# Patient Record
Sex: MALE | Race: WHITE | NOT HISPANIC OR LATINO | Employment: FULL TIME | URBAN - METROPOLITAN AREA
[De-identification: names, ages, dates, MRNs, and addresses within clinical notes are randomized per-mention and may not be internally consistent; named-entity substitution may affect disease eponyms.]

---

## 2023-02-23 ENCOUNTER — APPOINTMENT (OUTPATIENT)
Dept: GENERAL RADIOLOGY | Facility: HOSPITAL | Age: 33
End: 2023-02-23
Payer: COMMERCIAL

## 2023-02-23 ENCOUNTER — HOSPITAL ENCOUNTER (EMERGENCY)
Facility: HOSPITAL | Age: 33
Discharge: HOME | End: 2023-02-23
Attending: EMERGENCY MEDICINE | Admitting: EMERGENCY MEDICINE
Payer: COMMERCIAL

## 2023-02-23 VITALS
TEMPERATURE: 98.1 F | SYSTOLIC BLOOD PRESSURE: 130 MMHG | RESPIRATION RATE: 18 BRPM | HEART RATE: 96 BPM | DIASTOLIC BLOOD PRESSURE: 83 MMHG | BODY MASS INDEX: 25.18 KG/M2 | OXYGEN SATURATION: 95 % | WEIGHT: 170 LBS | HEIGHT: 69 IN

## 2023-02-23 DIAGNOSIS — J45.909 ASTHMA DUE TO ENVIRONMENTAL ALLERGIES: ICD-10-CM

## 2023-02-23 DIAGNOSIS — J45.901 MODERATE ASTHMA WITH EXACERBATION, UNSPECIFIED WHETHER PERSISTENT: Primary | ICD-10-CM

## 2023-02-23 LAB
ALBUMIN SERPL-MCNC: 4.8 G/DL (ref 3.5–5.2)
ALBUMIN/GLOB SERPL: 1.8 G/DL
ALP SERPL-CCNC: 64 U/L (ref 39–117)
ALT SERPL W P-5'-P-CCNC: 20 U/L (ref 1–41)
ANION GAP SERPL CALCULATED.3IONS-SCNC: 6.5 MMOL/L (ref 5–15)
AST SERPL-CCNC: 24 U/L (ref 1–40)
BASOPHILS # BLD AUTO: 0.06 10*3/MM3 (ref 0–0.2)
BASOPHILS NFR BLD AUTO: 0.5 % (ref 0–1.5)
BILIRUB SERPL-MCNC: 0.7 MG/DL (ref 0–1.2)
BUN SERPL-MCNC: 13 MG/DL (ref 6–20)
BUN/CREAT SERPL: 11.3 (ref 7–25)
CALCIUM SPEC-SCNC: 9.9 MG/DL (ref 8.6–10.5)
CHLORIDE SERPL-SCNC: 103 MMOL/L (ref 98–107)
CO2 SERPL-SCNC: 28.5 MMOL/L (ref 22–29)
CREAT SERPL-MCNC: 1.15 MG/DL (ref 0.76–1.27)
D DIMER PPP FEU-MCNC: <0.27 MCGFEU/ML (ref 0–0.5)
DEPRECATED RDW RBC AUTO: 39 FL (ref 37–54)
EGFRCR SERPLBLD CKD-EPI 2021: 87.3 ML/MIN/1.73
EOSINOPHIL # BLD AUTO: 0.63 10*3/MM3 (ref 0–0.4)
EOSINOPHIL NFR BLD AUTO: 5 % (ref 0.3–6.2)
ERYTHROCYTE [DISTWIDTH] IN BLOOD BY AUTOMATED COUNT: 11.9 % (ref 12.3–15.4)
GEN 5 2HR TROPONIN T REFLEX: <6 NG/L
GLOBULIN UR ELPH-MCNC: 2.7 GM/DL
GLUCOSE SERPL-MCNC: 139 MG/DL (ref 65–99)
HCT VFR BLD AUTO: 40.3 % (ref 37.5–51)
HGB BLD-MCNC: 14.1 G/DL (ref 13–17.7)
IMM GRANULOCYTES # BLD AUTO: 0.04 10*3/MM3 (ref 0–0.05)
IMM GRANULOCYTES NFR BLD AUTO: 0.3 % (ref 0–0.5)
INR PPP: 0.94 (ref 0.9–1.1)
LYMPHOCYTES # BLD AUTO: 1.68 10*3/MM3 (ref 0.7–3.1)
LYMPHOCYTES NFR BLD AUTO: 13.3 % (ref 19.6–45.3)
MAGNESIUM SERPL-MCNC: 2 MG/DL (ref 1.6–2.6)
MCH RBC QN AUTO: 31 PG (ref 26.6–33)
MCHC RBC AUTO-ENTMCNC: 35 G/DL (ref 31.5–35.7)
MCV RBC AUTO: 88.6 FL (ref 79–97)
MONOCYTES # BLD AUTO: 0.62 10*3/MM3 (ref 0.1–0.9)
MONOCYTES NFR BLD AUTO: 4.9 % (ref 5–12)
NEUTROPHILS NFR BLD AUTO: 76 % (ref 42.7–76)
NEUTROPHILS NFR BLD AUTO: 9.63 10*3/MM3 (ref 1.7–7)
NRBC BLD AUTO-RTO: 0 /100 WBC (ref 0–0.2)
NT-PROBNP SERPL-MCNC: 50.2 PG/ML (ref 0–450)
PLATELET # BLD AUTO: 262 10*3/MM3 (ref 140–450)
PMV BLD AUTO: 9.8 FL (ref 6–12)
POTASSIUM SERPL-SCNC: 3.6 MMOL/L (ref 3.5–5.2)
PROT SERPL-MCNC: 7.5 G/DL (ref 6–8.5)
PROTHROMBIN TIME: 12.7 SECONDS (ref 11.7–14.2)
QT INTERVAL: 345 MS
RBC # BLD AUTO: 4.55 10*6/MM3 (ref 4.14–5.8)
SODIUM SERPL-SCNC: 138 MMOL/L (ref 136–145)
TROPONIN T DELTA: NORMAL
TROPONIN T SERPL HS-MCNC: 6 NG/L
WBC NRBC COR # BLD: 12.66 10*3/MM3 (ref 3.4–10.8)

## 2023-02-23 PROCEDURE — 93005 ELECTROCARDIOGRAM TRACING: CPT | Performed by: EMERGENCY MEDICINE

## 2023-02-23 PROCEDURE — 25010000002 METHYLPREDNISOLONE PER 125 MG: Performed by: EMERGENCY MEDICINE

## 2023-02-23 PROCEDURE — 36415 COLL VENOUS BLD VENIPUNCTURE: CPT

## 2023-02-23 PROCEDURE — 71045 X-RAY EXAM CHEST 1 VIEW: CPT

## 2023-02-23 PROCEDURE — 93010 ELECTROCARDIOGRAM REPORT: CPT | Performed by: INTERNAL MEDICINE

## 2023-02-23 PROCEDURE — 85025 COMPLETE CBC W/AUTO DIFF WBC: CPT | Performed by: EMERGENCY MEDICINE

## 2023-02-23 PROCEDURE — 85610 PROTHROMBIN TIME: CPT | Performed by: EMERGENCY MEDICINE

## 2023-02-23 PROCEDURE — 84484 ASSAY OF TROPONIN QUANT: CPT | Performed by: EMERGENCY MEDICINE

## 2023-02-23 PROCEDURE — 94799 UNLISTED PULMONARY SVC/PX: CPT

## 2023-02-23 PROCEDURE — 85379 FIBRIN DEGRADATION QUANT: CPT | Performed by: EMERGENCY MEDICINE

## 2023-02-23 PROCEDURE — 83880 ASSAY OF NATRIURETIC PEPTIDE: CPT | Performed by: EMERGENCY MEDICINE

## 2023-02-23 PROCEDURE — 96374 THER/PROPH/DIAG INJ IV PUSH: CPT

## 2023-02-23 PROCEDURE — 94664 DEMO&/EVAL PT USE INHALER: CPT

## 2023-02-23 PROCEDURE — 99284 EMERGENCY DEPT VISIT MOD MDM: CPT

## 2023-02-23 PROCEDURE — 83735 ASSAY OF MAGNESIUM: CPT | Performed by: EMERGENCY MEDICINE

## 2023-02-23 PROCEDURE — 94640 AIRWAY INHALATION TREATMENT: CPT

## 2023-02-23 PROCEDURE — 80053 COMPREHEN METABOLIC PANEL: CPT | Performed by: EMERGENCY MEDICINE

## 2023-02-23 RX ORDER — SODIUM CHLORIDE 0.9 % (FLUSH) 0.9 %
10 SYRINGE (ML) INJECTION AS NEEDED
Status: DISCONTINUED | OUTPATIENT
Start: 2023-02-23 | End: 2023-02-23 | Stop reason: HOSPADM

## 2023-02-23 RX ORDER — ALBUTEROL SULFATE 90 UG/1
2 AEROSOL, METERED RESPIRATORY (INHALATION) EVERY 6 HOURS PRN
Qty: 6.7 G | Refills: 0 | Status: SHIPPED | OUTPATIENT
Start: 2023-02-23

## 2023-02-23 RX ORDER — PREDNISONE 20 MG/1
20 TABLET ORAL 3 TIMES DAILY
Qty: 15 TABLET | Refills: 0 | Status: SHIPPED | OUTPATIENT
Start: 2023-02-23 | End: 2023-02-28

## 2023-02-23 RX ORDER — ALBUTEROL SULFATE 2.5 MG/3ML
2.5 SOLUTION RESPIRATORY (INHALATION)
Status: COMPLETED | OUTPATIENT
Start: 2023-02-23 | End: 2023-02-23

## 2023-02-23 RX ORDER — METHYLPREDNISOLONE SODIUM SUCCINATE 125 MG/2ML
125 INJECTION, POWDER, LYOPHILIZED, FOR SOLUTION INTRAMUSCULAR; INTRAVENOUS ONCE
Status: COMPLETED | OUTPATIENT
Start: 2023-02-23 | End: 2023-02-23

## 2023-02-23 RX ADMIN — ALBUTEROL SULFATE 2.5 MG: 2.5 SOLUTION RESPIRATORY (INHALATION) at 08:38

## 2023-02-23 RX ADMIN — ALBUTEROL SULFATE 2.5 MG: 2.5 SOLUTION RESPIRATORY (INHALATION) at 08:39

## 2023-02-23 RX ADMIN — METHYLPREDNISOLONE SODIUM SUCCINATE 125 MG: 125 INJECTION, POWDER, FOR SOLUTION INTRAMUSCULAR; INTRAVENOUS at 08:31

## 2023-02-23 NOTE — ED PROVIDER NOTES
EMERGENCY DEPARTMENT ENCOUNTER    Room Number:  33/33  Date of encounter:  2/23/2023  PCP: Luma Villavicencio APRN  Historian: Patient  Relevant information and history provided by sources other than the patient will be included below and in the ED Course.  Review of pertinent past medical records may also be included in record below and ED Course.    HPI:  Chief Complaint: Shortness of breath  A complete HPI/ROS/PMH/PSH/SH/FH are unobtainable due to: Not applicable  Context: Sachin Singer is a 32 y.o. male who presents to the ED c/o symptoms started last night about 9 PM.  Started getting shortness of breath.  He feels as if his allergies and asthma are acting up.  He takes Claritin on a daily basis.  He also takes albuterol as his rescue inhaler.  He could not find his rescue inhaler.  Had increased shortness of breath through the night.  Has had a cough that is increased.  Cough is mainly dry but at times green phlegm.  He reports no fever, chest pain, abdominal pain.  Reports no leg pain or leg swelling.  He does have a headache from all the coughing.  The headache is not severe it is mild to moderate.  He has no visual change, speech change or any focal weakness to arms or legs.  Denies any nausea vomiting or diarrhea.        Previous Episodes: Yes he feels this is probably an exacerbation of his asthma from his environmental allergies.  Current Symptoms: See above    MEDICAL HISTORY REVIEWED  Other than environmental allergies and asthma he denies any other medical problems.  He does not smoke tobacco.  He does chew tobacco.  He has no history of any heart problems and any other lung problems other than asthma.  Does not take any other medicines.  He does not use any illegal drugs.    Patient has a history of asthma.  I do not see any medical records in epic.  I checked care everywhere under [facility] and saw no records as well.    PAST MEDICAL HISTORY  Active Ambulatory Problems     Diagnosis Date Noted   • No  Active Ambulatory Problems     Resolved Ambulatory Problems     Diagnosis Date Noted   • No Resolved Ambulatory Problems     No Additional Past Medical History         PAST SURGICAL HISTORY  History reviewed. No pertinent surgical history.      FAMILY HISTORY  History reviewed. No pertinent family history.      SOCIAL HISTORY  Social History     Socioeconomic History   • Marital status:          ALLERGIES  Patient has no known allergies.        REVIEW OF SYSTEMS  Review of Systems     All systems reviewed and negative except for those discussed in HPI.       PHYSICAL EXAM    I have reviewed the triage vital signs and nursing notes.    ED Triage Vitals   Temp Pulse Resp BP SpO2   -- -- -- -- --      Temp src Heart Rate Source Patient Position BP Location FiO2 (%)   -- -- -- -- --       GENERAL: Young male.  He is mildly tachypneic and appears mildly short of breath. .Vital signs on my initial evaluation O2 sat is 96% on room air.  Patient's heart rate is in the low 100s and appears as if it is a sinus tachycardia.  Patient's blood pressure is normal.  HENT: nares patent  Head/neck/ face are symmetric without gross deformity, signs of trauma, or swelling  EYES: no scleral icterus, no conjunctival pallor.  NECK: Supple, no meningismus  CV: regular rhythm, regular rate with intact distal pulses.  No murmur rub  RESPIRATORY: Mild respiratory distress.  He has mild tachypnea.  I hear mild expiratory wheeze.  It appears as if he is not moving air the best.  He does have a mild cough on exam that is dry.  ABDOMEN: soft and nontender.  MUSCULOSKELETAL: no deformity.  Intact distal pulses to upper and lower extremities are equal strong and symmetric.  No edema.  NEURO: alert and appropriate, moves all extremities, follows commands.  No focal motor or sensory changes  SKIN: warm, dry    Vital signs and nursing notes reviewed.        LAB RESULTS  Recent Results (from the past 24 hour(s))   Comprehensive Metabolic Panel     Collection Time: 02/23/23  8:27 AM    Specimen: Blood   Result Value Ref Range    Glucose 139 (H) 65 - 99 mg/dL    BUN 13 6 - 20 mg/dL    Creatinine 1.15 0.76 - 1.27 mg/dL    Sodium 138 136 - 145 mmol/L    Potassium 3.6 3.5 - 5.2 mmol/L    Chloride 103 98 - 107 mmol/L    CO2 28.5 22.0 - 29.0 mmol/L    Calcium 9.9 8.6 - 10.5 mg/dL    Total Protein 7.5 6.0 - 8.5 g/dL    Albumin 4.8 3.5 - 5.2 g/dL    ALT (SGPT) 20 1 - 41 U/L    AST (SGOT) 24 1 - 40 U/L    Alkaline Phosphatase 64 39 - 117 U/L    Total Bilirubin 0.7 0.0 - 1.2 mg/dL    Globulin 2.7 gm/dL    A/G Ratio 1.8 g/dL    BUN/Creatinine Ratio 11.3 7.0 - 25.0    Anion Gap 6.5 5.0 - 15.0 mmol/L    eGFR 87.3 >60.0 mL/min/1.73   Protime-INR    Collection Time: 02/23/23  8:27 AM    Specimen: Blood   Result Value Ref Range    Protime 12.7 11.7 - 14.2 Seconds    INR 0.94 0.90 - 1.10   BNP    Collection Time: 02/23/23  8:27 AM    Specimen: Blood   Result Value Ref Range    proBNP 50.2 0.0 - 450.0 pg/mL   High Sensitivity Troponin T    Collection Time: 02/23/23  8:27 AM    Specimen: Blood   Result Value Ref Range    HS Troponin T 6 <15 ng/L   D-dimer, Quantitative    Collection Time: 02/23/23  8:27 AM    Specimen: Blood   Result Value Ref Range    D-Dimer, Quantitative <0.27 0.00 - 0.50 MCGFEU/mL   Magnesium    Collection Time: 02/23/23  8:27 AM    Specimen: Blood   Result Value Ref Range    Magnesium 2.0 1.6 - 2.6 mg/dL   CBC Auto Differential    Collection Time: 02/23/23  8:27 AM    Specimen: Blood   Result Value Ref Range    WBC 12.66 (H) 3.40 - 10.80 10*3/mm3    RBC 4.55 4.14 - 5.80 10*6/mm3    Hemoglobin 14.1 13.0 - 17.7 g/dL    Hematocrit 40.3 37.5 - 51.0 %    MCV 88.6 79.0 - 97.0 fL    MCH 31.0 26.6 - 33.0 pg    MCHC 35.0 31.5 - 35.7 g/dL    RDW 11.9 (L) 12.3 - 15.4 %    RDW-SD 39.0 37.0 - 54.0 fl    MPV 9.8 6.0 - 12.0 fL    Platelets 262 140 - 450 10*3/mm3    Neutrophil % 76.0 42.7 - 76.0 %    Lymphocyte % 13.3 (L) 19.6 - 45.3 %    Monocyte % 4.9 (L) 5.0 - 12.0 %     Eosinophil % 5.0 0.3 - 6.2 %    Basophil % 0.5 0.0 - 1.5 %    Immature Grans % 0.3 0.0 - 0.5 %    Neutrophils, Absolute 9.63 (H) 1.70 - 7.00 10*3/mm3    Lymphocytes, Absolute 1.68 0.70 - 3.10 10*3/mm3    Monocytes, Absolute 0.62 0.10 - 0.90 10*3/mm3    Eosinophils, Absolute 0.63 (H) 0.00 - 0.40 10*3/mm3    Basophils, Absolute 0.06 0.00 - 0.20 10*3/mm3    Immature Grans, Absolute 0.04 0.00 - 0.05 10*3/mm3    nRBC 0.0 0.0 - 0.2 /100 WBC   ECG 12 Lead Dyspnea    Collection Time: 02/23/23  9:52 AM   Result Value Ref Range    QT Interval 345 ms   High Sensitivity Troponin T 2Hr    Collection Time: 02/23/23 10:26 AM    Specimen: Blood   Result Value Ref Range    HS Troponin T <6 <15 ng/L    Troponin T Delta         Ordered the above labs and independently reviewed the results.        RADIOLOGY  XR Chest 1 View    Result Date: 2/23/2023  PORTABLE CHEST X-RAY  HISTORY: Shortness of breath and cough.  Portable chest x-ray is provided. Correlation: None.  FINDINGS: The cardiomediastinal silhouette is normal. The lungs are clear. The costophrenic sulci are dry and the bones appear normal. There is no pneumothorax.      Negative.  This report was finalized on 2/23/2023 9:34 AM by Dr. Raul Mota M.D.        I ordered the above noted radiological studies. Reviewed by me and discussed with radiologist.  See dictation for official radiology interpretation.      PROCEDURES    Procedures      MEDICATIONS GIVEN IN ER    Medications   albuterol (PROVENTIL) nebulizer solution 0.083% 2.5 mg/3mL (2.5 mg Nebulization Given 2/23/23 0839)   methylPREDNISolone sodium succinate (SOLU-Medrol) injection 125 mg (125 mg Intravenous Given 2/23/23 0831)         All labs have been independently reviewed by me.  All radiology studies have been reviewed by me and I discussed with radiologist dictating the report when indicated below.  All EKG's independently viewed and interpreted by me.  Discussion below represents my analysis of pertinent  findings related to patient's condition, differential diagnosis, treatment plan and final disposition.        PROGRESS, DATA ANALYSIS, CONSULTS, AND MEDICAL DECISION MAKING    DDx includes CHF, acute coronary syndrome, pulmonary embolism, pneumothorax, pneumonia, asthma/COPD,aspiration,  pulmonary hypertension, metabolic acidosis, deconditioning, anemia, other hematologic etiologies such as CO poisoning, anxiety.     Ultimately I feel this is likely an exacerbation of his asthma from environmental allergies.  He has never had a DVT and no history of heart problems.  He has no risk factors for DVT.  But I am hearing a small amount of wheezing.  I anticipate I am not hearing much wheeze because he is not moving much air.  He has a mild tachycardia and his oxygen saturation is 96% on room air.  I am going to check an x-ray treat him with albuterol and Solu-Medrol I am Faith check lab work including a dimer.  He he has no risk factors again for DVT.  We will continue to monitor him and watch for improvement.  This is a potential admission if he does not improve and breathe better.  I informed patient of my treatment plan.  All questions answered.    We are currently under a pandemic from the COVID19 infection.  The patient presented to the emergency department by ambulance or personal vehicle. I followed the current protocols required by Infection Control at  in my evaluation and treatment of the patient. The patient was wearing a face mask during my evaluation and throughout my encounter. During my whole encounter with this patient I used appropriate personal protective equipment.  This equipment consisted of eye protection, facemask, gown, and gloves.  I applied this equipment before entering the room.        ED Course as of 02/23/23 1608   Thu Feb 23, 2023   1052 D-Dimer, Quant: <0.27 [MM]   1052 My own independent interpretation of the chest x-ray I do not see any obvious pulmonary  consolidation I do not see signs of congestive heart failure.  Heart size and mediastinum appear normal in size.  No acute process.  No pneumothorax  I did review the radiologist report and no active process mentioned in his report as well please see complete dictated report from radiologist. [MM]   1052 My own independent interpretation of the EKG  EKG was done at 9:52 AM  Its a rate of 100 there is mild intraventricular conduction delay with normal axis I do not see any acute injury pattern there are some nonspecific T wave changes  QT looks normal  I do not appreciate or see any old EKG to compare with. [MM]   1130 Patient is feeling 100% better.  He has no respiratory distress.  On my reevaluation of the eye him he is moving air much better able to take deep breaths.  As I clinically suspected initially this is very likely environmental allergies triggering an asthmatic response.  The very mild wheezing I heard initially likely was from decreased air movement.  Patient has Claritin at home.  He has tolerated steroids in the past.  On any give him a prescription for steroids as well as inhaler here.  I Faith see if [facility] pharmacy can fill it.  He would prefer that.  All questions answered.  Patient agrees with the plan. [MM]      ED Course User Index  [MM] Misha Up MD       AS OF 16:08 EST VITALS:    BP - 130/83  HR - 96  TEMP - 98.1 °F (36.7 °C) (Tympanic)  02 SATS - 95%    SOCIAL DETERMINANTS OF HEALTH THAT IMPACT OR LIMIT CARE (For example..Homelessness,safe discharge, inability to obtain care, follow up, or prescriptions):      DIAGNOSIS  Final diagnoses:   Moderate asthma with exacerbation, unspecified whether persistent   Asthma due to environmental allergies         DISPOSITION  DISCHARGE    Patient discharged in stable condition.    Reviewed implications of results, diagnosis, meds, responsibility to follow up, warning signs and symptoms of possible worsening, potential complications and  reasons to return to ER, including worsening of symptoms, worsening shortness of breath, fever, any bad pain, or any concerns..    Patient/Family voiced understanding of above instructions.    Discussed plan for discharge, as there is no emergent indication for admission. Pt/family is agreeable and understands need for follow up and repeat testing.  Pt is aware that discharge does not mean that nothing is wrong but it indicates no emergency is present that requires admission and they must continue care with follow-up as given below or physician of their choice.     FOLLOW-UP  Luma Villavicencio, THEO  [address]  [facility] [address]  [facility] [address]  [phone]    In 1 week  Return if worsening of shortness of breath, fever, any concerns.         Medication List      New Prescriptions    albuterol sulfate  (90 Base) MCG/ACT inhaler  Commonly known as: PROVENTIL HFA;VENTOLIN HFA;PROAIR HFA  Inhale 2 puffs Every 6 (Six) Hours As Needed for Wheezing or Shortness of Air.     predniSONE 20 MG tablet  Commonly known as: DELTASONE  Take 1 tablet by mouth 3 (Three) Times a Day for 5 days.           Where to Get Your Medications      These medications were sent to [facility] [place] [address] [address]    Hours: 7:00 AM-6:00 PM Mon-Fri, 8:00 AM-4:30 PM Sat-Sun (Closed 12-12:30PM) Phone: [phone]   · albuterol sulfate  (90 Base) MCG/ACT inhaler  · predniSONE 20 MG tablet                 DICTATED UTILIZING DRAGON DICTATION    Note Disclaimer: At [facility], we believe that sharing information builds trust and better relationships. You are receiving this note because you recently visited [facility]. It is possible you will see health information before a provider has talked with you about it. This kind of information can be easy to misunderstand. To help you fully understand what it means for your health, we urge you to discuss this note with your provider.      Misha Up MD  02/23/23 9704

## 2023-02-23 NOTE — ED TRIAGE NOTES
Pt has asthma and was soa all night.  He can't find his rescue inhaler.  He has had cough and HA    Patient was placed in face mask during first look triage.  Patient was wearing a face mask throughout encounter.  I wore personal protective equipment throughout the encounter.  Hand hygiene was performed before and after patient encounter.

## 2023-02-23 NOTE — DISCHARGE INSTRUCTIONS
As we discussed, continue with Claritin as previously prescribed.  Take albuterol inhaler and prednisone as prescribed.  Return if you have worsening of symptoms and worsening shortness of breath, fever, any concerns.

## 2023-04-12 ENCOUNTER — APPOINTMENT (OUTPATIENT)
Dept: CT IMAGING | Facility: HOSPITAL | Age: 33
End: 2023-04-12
Payer: COMMERCIAL

## 2023-04-12 ENCOUNTER — HOSPITAL ENCOUNTER (EMERGENCY)
Facility: HOSPITAL | Age: 33
Discharge: HOME | End: 2023-04-12
Attending: EMERGENCY MEDICINE | Admitting: EMERGENCY MEDICINE
Payer: COMMERCIAL

## 2023-04-12 VITALS
TEMPERATURE: 98.6 F | DIASTOLIC BLOOD PRESSURE: 78 MMHG | RESPIRATION RATE: 16 BRPM | HEIGHT: 69 IN | SYSTOLIC BLOOD PRESSURE: 110 MMHG | OXYGEN SATURATION: 94 % | WEIGHT: 170 LBS | BODY MASS INDEX: 25.18 KG/M2 | HEART RATE: 47 BPM

## 2023-04-12 DIAGNOSIS — N20.0 BILATERAL NEPHROLITHIASIS: ICD-10-CM

## 2023-04-12 DIAGNOSIS — R10.9 RIGHT FLANK PAIN: Primary | ICD-10-CM

## 2023-04-12 DIAGNOSIS — N13.30 HYDRONEPHROSIS, UNSPECIFIED HYDRONEPHROSIS TYPE: ICD-10-CM

## 2023-04-12 DIAGNOSIS — J30.9 ALLERGIC RHINITIS, UNSPECIFIED SEASONALITY, UNSPECIFIED TRIGGER: ICD-10-CM

## 2023-04-12 LAB
ALBUMIN SERPL-MCNC: 4.3 G/DL (ref 3.5–5.2)
ALBUMIN/GLOB SERPL: 1.7 G/DL
ALP SERPL-CCNC: 58 U/L (ref 39–117)
ALT SERPL W P-5'-P-CCNC: 20 U/L (ref 1–41)
ANION GAP SERPL CALCULATED.3IONS-SCNC: 9.1 MMOL/L (ref 5–15)
AST SERPL-CCNC: 26 U/L (ref 1–40)
BACTERIA UR QL AUTO: ABNORMAL /HPF
BASOPHILS # BLD AUTO: 0.07 10*3/MM3 (ref 0–0.2)
BASOPHILS NFR BLD AUTO: 0.7 % (ref 0–1.5)
BILIRUB SERPL-MCNC: 0.5 MG/DL (ref 0–1.2)
BILIRUB UR QL STRIP: NEGATIVE
BUN SERPL-MCNC: 15 MG/DL (ref 6–20)
BUN/CREAT SERPL: 11.9 (ref 7–25)
CALCIUM SPEC-SCNC: 9.5 MG/DL (ref 8.6–10.5)
CHLORIDE SERPL-SCNC: 107 MMOL/L (ref 98–107)
CK SERPL-CCNC: 712 U/L (ref 20–200)
CLARITY UR: ABNORMAL
CO2 SERPL-SCNC: 25.9 MMOL/L (ref 22–29)
COLOR UR: ABNORMAL
CREAT SERPL-MCNC: 1.26 MG/DL (ref 0.76–1.27)
DEPRECATED RDW RBC AUTO: 39.5 FL (ref 37–54)
EGFRCR SERPLBLD CKD-EPI 2021: 78.2 ML/MIN/1.73
EOSINOPHIL # BLD AUTO: 1.19 10*3/MM3 (ref 0–0.4)
EOSINOPHIL NFR BLD AUTO: 12.2 % (ref 0.3–6.2)
ERYTHROCYTE [DISTWIDTH] IN BLOOD BY AUTOMATED COUNT: 12.1 % (ref 12.3–15.4)
GLOBULIN UR ELPH-MCNC: 2.5 GM/DL
GLUCOSE SERPL-MCNC: 112 MG/DL (ref 65–99)
GLUCOSE UR STRIP-MCNC: NEGATIVE MG/DL
HCT VFR BLD AUTO: 40.9 % (ref 37.5–51)
HGB BLD-MCNC: 14.1 G/DL (ref 13–17.7)
HGB UR QL STRIP.AUTO: ABNORMAL
HYALINE CASTS UR QL AUTO: ABNORMAL /LPF
IMM GRANULOCYTES # BLD AUTO: 0.04 10*3/MM3 (ref 0–0.05)
IMM GRANULOCYTES NFR BLD AUTO: 0.4 % (ref 0–0.5)
KETONES UR QL STRIP: ABNORMAL
LEUKOCYTE ESTERASE UR QL STRIP.AUTO: ABNORMAL
LIPASE SERPL-CCNC: 18 U/L (ref 13–60)
LYMPHOCYTES # BLD AUTO: 2.7 10*3/MM3 (ref 0.7–3.1)
LYMPHOCYTES NFR BLD AUTO: 27.7 % (ref 19.6–45.3)
MCH RBC QN AUTO: 30.7 PG (ref 26.6–33)
MCHC RBC AUTO-ENTMCNC: 34.5 G/DL (ref 31.5–35.7)
MCV RBC AUTO: 88.9 FL (ref 79–97)
MONOCYTES # BLD AUTO: 0.68 10*3/MM3 (ref 0.1–0.9)
MONOCYTES NFR BLD AUTO: 7 % (ref 5–12)
NEUTROPHILS NFR BLD AUTO: 5.05 10*3/MM3 (ref 1.7–7)
NEUTROPHILS NFR BLD AUTO: 52 % (ref 42.7–76)
NITRITE UR QL STRIP: NEGATIVE
NRBC BLD AUTO-RTO: 0 /100 WBC (ref 0–0.2)
PH UR STRIP.AUTO: 5.5 [PH] (ref 5–8)
PLATELET # BLD AUTO: 269 10*3/MM3 (ref 140–450)
PMV BLD AUTO: 10 FL (ref 6–12)
POTASSIUM SERPL-SCNC: 3.6 MMOL/L (ref 3.5–5.2)
PROT SERPL-MCNC: 6.8 G/DL (ref 6–8.5)
PROT UR QL STRIP: ABNORMAL
RBC # BLD AUTO: 4.6 10*6/MM3 (ref 4.14–5.8)
RBC # UR STRIP: ABNORMAL /HPF
REF LAB TEST METHOD: ABNORMAL
SODIUM SERPL-SCNC: 142 MMOL/L (ref 136–145)
SP GR UR STRIP: 1.03 (ref 1–1.03)
SQUAMOUS #/AREA URNS HPF: ABNORMAL /HPF
UROBILINOGEN UR QL STRIP: ABNORMAL
WBC # UR STRIP: ABNORMAL /HPF
WBC NRBC COR # BLD: 9.73 10*3/MM3 (ref 3.4–10.8)

## 2023-04-12 PROCEDURE — 96375 TX/PRO/DX INJ NEW DRUG ADDON: CPT

## 2023-04-12 PROCEDURE — 81001 URINALYSIS AUTO W/SCOPE: CPT | Performed by: PHYSICIAN ASSISTANT

## 2023-04-12 PROCEDURE — 83690 ASSAY OF LIPASE: CPT | Performed by: PHYSICIAN ASSISTANT

## 2023-04-12 PROCEDURE — 25010000002 KETOROLAC TROMETHAMINE PER 15 MG: Performed by: PHYSICIAN ASSISTANT

## 2023-04-12 PROCEDURE — 99284 EMERGENCY DEPT VISIT MOD MDM: CPT

## 2023-04-12 PROCEDURE — 82550 ASSAY OF CK (CPK): CPT | Performed by: PHYSICIAN ASSISTANT

## 2023-04-12 PROCEDURE — 96374 THER/PROPH/DIAG INJ IV PUSH: CPT

## 2023-04-12 PROCEDURE — 74176 CT ABD & PELVIS W/O CONTRAST: CPT

## 2023-04-12 PROCEDURE — 80053 COMPREHEN METABOLIC PANEL: CPT | Performed by: PHYSICIAN ASSISTANT

## 2023-04-12 PROCEDURE — 25010000002 ONDANSETRON PER 1 MG: Performed by: PHYSICIAN ASSISTANT

## 2023-04-12 PROCEDURE — 85025 COMPLETE CBC W/AUTO DIFF WBC: CPT | Performed by: PHYSICIAN ASSISTANT

## 2023-04-12 RX ORDER — FLUTICASONE PROPIONATE 50 MCG
2 SPRAY, SUSPENSION (ML) NASAL DAILY
Qty: 16 G | Refills: 0 | Status: SHIPPED | OUTPATIENT
Start: 2023-04-12

## 2023-04-12 RX ORDER — KETOROLAC TROMETHAMINE 15 MG/ML
15 INJECTION, SOLUTION INTRAMUSCULAR; INTRAVENOUS ONCE
Status: COMPLETED | OUTPATIENT
Start: 2023-04-12 | End: 2023-04-12

## 2023-04-12 RX ORDER — DICLOFENAC SODIUM 75 MG/1
75 TABLET, DELAYED RELEASE ORAL 2 TIMES DAILY
Qty: 20 TABLET | Refills: 0 | Status: SHIPPED | OUTPATIENT
Start: 2023-04-12

## 2023-04-12 RX ORDER — SODIUM CHLORIDE 0.9 % (FLUSH) 0.9 %
10 SYRINGE (ML) INJECTION AS NEEDED
Status: DISCONTINUED | OUTPATIENT
Start: 2023-04-12 | End: 2023-04-12 | Stop reason: HOSPADM

## 2023-04-12 RX ORDER — ONDANSETRON 2 MG/ML
4 INJECTION INTRAMUSCULAR; INTRAVENOUS ONCE
Status: COMPLETED | OUTPATIENT
Start: 2023-04-12 | End: 2023-04-12

## 2023-04-12 RX ORDER — ONDANSETRON 4 MG/1
4 TABLET, ORALLY DISINTEGRATING ORAL EVERY 8 HOURS PRN
Qty: 10 TABLET | Refills: 0 | Status: SHIPPED | OUTPATIENT
Start: 2023-04-12

## 2023-04-12 RX ORDER — ALBUTEROL SULFATE 90 UG/1
2 AEROSOL, METERED RESPIRATORY (INHALATION) EVERY 6 HOURS PRN
Qty: 6.7 G | Refills: 5 | Status: SHIPPED | OUTPATIENT
Start: 2023-04-12

## 2023-04-12 RX ADMIN — ONDANSETRON 4 MG: 2 INJECTION INTRAMUSCULAR; INTRAVENOUS at 03:39

## 2023-04-12 RX ADMIN — SODIUM CHLORIDE 1000 ML: 9 INJECTION, SOLUTION INTRAVENOUS at 03:37

## 2023-04-12 RX ADMIN — KETOROLAC TROMETHAMINE 15 MG: 15 INJECTION, SOLUTION INTRAMUSCULAR; INTRAVENOUS at 03:39

## 2023-04-12 NOTE — DISCHARGE INSTRUCTIONS
Home, rest, medicine as directed, keep well-hydrated, follow-up with urology for further evaluation and treatment.  Home medicine as prescribed, follow up with PCP for recheck. Return to care with further concerns.

## 2023-04-12 NOTE — ED PROVIDER NOTES
"The ALYCIA and I have discussed this patient's history, physical exam, and treatment plan.  I have reviewed the documentation and personally had a face to face interaction with the patient. I affirm the documentation and agree with the treatment and plan.  The attached note describes my personal findings.      I provided a substantive portion of the care of the patient.  I personally performed the physical exam in its entirety, and below are my findings.     Brief history of present illness: 32-year-old male with history of kidney stones presents with abrupt onset right flank pain and some associated nausea around 0 100 this morning.  Pain is much better at this time.  No dysuria hematuria, black or bloody stools,  vomiting or diarrhea otherwise reported.    Physical exam:    /85   Pulse 70   Temp 98.6 °F (37 °C) (Tympanic)   Resp 16   Ht 175.3 cm (69\")   Wt 77.1 kg (170 lb)   SpO2 96%   BMI 25.10 kg/m²       Physical Exam   Constitutional: No distress.  Nontoxic  HENT:  Head: Normocephalic and atraumatic.   Oropharynx: Mucous membranes are moist.   Eyes: . No scleral icterus. No conjunctival pallor.  Neck: Normal range of motion. Neck supple.   Cardiovascular: Pink warm and well perfused throughout.    Pulmonary/Chest: No respiratory distress.  No tachypnea or increased work of breathing appreciated.    Abdominal: Soft. There is no tenderness. There is no rebound and no guarding.   Musculoskeletal: Moves all extremities equally.    Neurological: Alert and oriented.  No acute focal deficit appreciated.  Skin: Skin is pink, warm, and dry.   Psychiatric: Mood and affect normal.   Nursing note and vitals reviewed.        MDM: Agree with plan laboratory and radiologic evaluations patient.       Lowell Tatum MD  04/12/23 0329    "

## 2023-04-12 NOTE — Clinical Note
[facility] EMERGENCY DEPARTMENT  [address] [facility] [address]  Phone: [phone]    Sachin Singer was seen and treated in our emergency department on 4/12/2023.  He may return to work on 04/14/2023.         Thank you for choosing [facility].    Elen Lujan RN

## 2023-04-12 NOTE — ED PROVIDER NOTES
EMERGENCY DEPARTMENT ENCOUNTER    Room Number:  02/02  Date of encounter:  4/12/2023  PCP: Luma Villavicencio APRN  Patient Care Team:  Luma Villavicencio APRN as PCP - General (Nurse Practitioner)   Independent Historians: Patient    HPI:  Chief Complaint: Right flank pain  A complete HPI/ROS/PMH/PSH/SH/FH are unobtainable due to: N/A    Chronic or social conditions impacting patient care (social determinants of health): None    Context: Sachin Singer is a 32 y.o. male with past medical history of asthma, allergies, and previous kidney stones who arrives to the ED with complaint of right flank pain.  Patient states that he woke up around 1:00 with severe right flank pain that radiated around to the front and was associated with nausea and dark urine.  Patient states that his symptoms are currently improved and feels similar to previous kidney stones that he has passed on his own.  Patient denies any fever or chills, no hematuria, no dysuria, no vomiting, chest pain, or shortness of breath.    Review of prior external notes (non-ED): Prior visits for asthma exacerbation.    Review of prior external test results outside of this encounter: No previous CT scans of the abdomen pelvis or abdominal imaging in our system.    PAST MEDICAL HISTORY  Active Ambulatory Problems     Diagnosis Date Noted   • No Active Ambulatory Problems     Resolved Ambulatory Problems     Diagnosis Date Noted   • No Resolved Ambulatory Problems     No Additional Past Medical History       The patient qualifies to receive the vaccine, but they have not yet received it.    PAST SURGICAL HISTORY  No past surgical history on file.      FAMILY HISTORY  No family history on file.      SOCIAL HISTORY  Social History     Socioeconomic History   • Marital status:          ALLERGIES  Patient has no known allergies.        REVIEW OF SYSTEMS  Review of Systems     All systems reviewed and negative except for those discussed in HPI.       PHYSICAL EXAM    I  have reviewed the triage vital signs and nursing notes.    ED Triage Vitals [04/12/23 0303]   Temp Heart Rate Resp BP SpO2   98.6 °F (37 °C) 70 16 135/85 96 %      Temp src Heart Rate Source Patient Position BP Location FiO2 (%)   Tympanic -- -- -- --       Physical Exam    GENERAL: alert and oriented x4, not distressed  HENT: normocephalic, atraumatic, moist mucous membranes  EYES: no scleral icterus, PERRL, EOMI  CV: regular rhythm, regular rate, no murmurs, rubs, or gallops  RESPIRATORY: normal effort, CTAB  ABDOMEN: soft, no rebound or guarding, mild right flank tenderness  MUSCULOSKELETAL: no deformity  NEURO: alert, moves all extremities, no focal neuro deficits, follows commands  SKIN: warm, dry, no rash   Psych: Appropriate mood and affect      Nursing notes and vital signs reviewed      LAB RESULTS  Recent Results (from the past 24 hour(s))   Comprehensive Metabolic Panel    Collection Time: 04/12/23  3:35 AM    Specimen: Blood   Result Value Ref Range    Glucose 112 (H) 65 - 99 mg/dL    BUN 15 6 - 20 mg/dL    Creatinine 1.26 0.76 - 1.27 mg/dL    Sodium 142 136 - 145 mmol/L    Potassium 3.6 3.5 - 5.2 mmol/L    Chloride 107 98 - 107 mmol/L    CO2 25.9 22.0 - 29.0 mmol/L    Calcium 9.5 8.6 - 10.5 mg/dL    Total Protein 6.8 6.0 - 8.5 g/dL    Albumin 4.3 3.5 - 5.2 g/dL    ALT (SGPT) 20 1 - 41 U/L    AST (SGOT) 26 1 - 40 U/L    Alkaline Phosphatase 58 39 - 117 U/L    Total Bilirubin 0.5 0.0 - 1.2 mg/dL    Globulin 2.5 gm/dL    A/G Ratio 1.7 g/dL    BUN/Creatinine Ratio 11.9 7.0 - 25.0    Anion Gap 9.1 5.0 - 15.0 mmol/L    eGFR 78.2 >60.0 mL/min/1.73   Lipase    Collection Time: 04/12/23  3:35 AM    Specimen: Blood   Result Value Ref Range    Lipase 18 13 - 60 U/L   CK    Collection Time: 04/12/23  3:35 AM    Specimen: Blood   Result Value Ref Range    Creatine Kinase 712 (H) 20 - 200 U/L   CBC Auto Differential    Collection Time: 04/12/23  3:35 AM    Specimen: Blood   Result Value Ref Range    WBC 9.73 3.40 -  10.80 10*3/mm3    RBC 4.60 4.14 - 5.80 10*6/mm3    Hemoglobin 14.1 13.0 - 17.7 g/dL    Hematocrit 40.9 37.5 - 51.0 %    MCV 88.9 79.0 - 97.0 fL    MCH 30.7 26.6 - 33.0 pg    MCHC 34.5 31.5 - 35.7 g/dL    RDW 12.1 (L) 12.3 - 15.4 %    RDW-SD 39.5 37.0 - 54.0 fl    MPV 10.0 6.0 - 12.0 fL    Platelets 269 140 - 450 10*3/mm3    Neutrophil % 52.0 42.7 - 76.0 %    Lymphocyte % 27.7 19.6 - 45.3 %    Monocyte % 7.0 5.0 - 12.0 %    Eosinophil % 12.2 (H) 0.3 - 6.2 %    Basophil % 0.7 0.0 - 1.5 %    Immature Grans % 0.4 0.0 - 0.5 %    Neutrophils, Absolute 5.05 1.70 - 7.00 10*3/mm3    Lymphocytes, Absolute 2.70 0.70 - 3.10 10*3/mm3    Monocytes, Absolute 0.68 0.10 - 0.90 10*3/mm3    Eosinophils, Absolute 1.19 (H) 0.00 - 0.40 10*3/mm3    Basophils, Absolute 0.07 0.00 - 0.20 10*3/mm3    Immature Grans, Absolute 0.04 0.00 - 0.05 10*3/mm3    nRBC 0.0 0.0 - 0.2 /100 WBC   Urinalysis With Microscopic If Indicated (No Culture) - Urine, Clean Catch    Collection Time: 04/12/23  3:36 AM    Specimen: Urine, Clean Catch   Result Value Ref Range    Color, UA Dark Yellow (A) Yellow, Straw    Appearance, UA Cloudy (A) Clear    pH, UA 5.5 5.0 - 8.0    Specific Gravity, UA 1.028 1.005 - 1.030    Glucose, UA Negative Negative    Ketones, UA Trace (A) Negative    Bilirubin, UA Negative Negative    Blood, UA Large (3+) (A) Negative    Protein, UA 30 mg/dL (1+) (A) Negative    Leuk Esterase, UA Trace (A) Negative    Nitrite, UA Negative Negative    Urobilinogen, UA 1.0 E.U./dL 0.2 - 1.0 E.U./dL   Urinalysis, Microscopic Only - Urine, Clean Catch    Collection Time: 04/12/23  3:36 AM    Specimen: Urine, Clean Catch   Result Value Ref Range    RBC, UA Too Numerous to Count (A) None Seen, 0-2 /HPF    WBC, UA 3-5 (A) None Seen, 0-2 /HPF    Bacteria, UA None Seen None Seen /HPF    Squamous Epithelial Cells, UA 0-2 None Seen, 0-2 /HPF    Hyaline Casts, UA 7-12 None Seen /LPF    Methodology Automated Microscopy        Ordered the above labs and  independently reviewed and interpreted the results by me.        RADIOLOGY  CT Abdomen Pelvis Without Contrast    Result Date: 4/12/2023  Patient: DYLAN CUEVAS  Time Out: 05:13 Exam(s): CT ABDOMEN + PELVIS Without Contrast EXAM:   CT Abdomen and Pelvis Without Intravenous Contrast CLINICAL HISTORY:    right flank pain. TECHNIQUE:   Axial computed tomography images of the abdomen and pelvis without intravenous contrast.  CTDI is 7.31 mGy and DLP is 398.7 mGy-cm.  This CT exam was performed according to the principle of ALARA (As Low As Reasonably Achievable) by using one or more of the following dose reduction techniques: automated exposure control, adjustment of the mA and or kV according to patient size, and or use of iterative reconstruction technique. COMPARISON:   No relevant prior studies available. FINDINGS:   Lung bases:  Unremarkable.  No mass.  No consolidation.  ABDOMEN:   Liver:  Unremarkable.   Gallbladder and bile ducts:  Unremarkable.  No calcified stones.  No ductal dilation.   Pancreas:  Unremarkable.  No ductal dilation.   Spleen:  Unremarkable.  No splenomegaly.   Adrenals:  Unremarkable.  No mass.   Kidneys and ureters:  Bilateral nonobstructive nephrolithiasis, measuring up to 4 mm in the upper pole of the right kidney and 4 mm in the mid pole of the left kidney.  Mild right-sided hydronephrosis with associated mild perinephric fat stranding.  An obstructing ureteral stone is not definitively seen.  This may represent a recently passed stone.   Stomach and bowel:  Increased colonic stool burden.  No obstruction.  No mucosal thickening.  PELVIS:   Appendix:  No findings to suggest acute appendicitis.   Bladder:  Unremarkable.  No stones.   Reproductive:  Unremarkable as visualized.  ABDOMEN and PELVIS:   Intraperitoneal space:  Unremarkable.  No free air.  No significant fluid collection.   Bones joints:  No acute fracture.  No dislocation.   Soft tissues:  Unremarkable.   Vasculature:   Unremarkable.  No abdominal aortic aneurysm.   Lymph nodes:  Unremarkable.  No enlarged lymph nodes. IMPRESSION:     1.  Bilateral nonobstructive nephrolithiasis.  Mild right-sided hydronephrosis with associated mild perinephric fat stranding.  An obstructing ureteral stone is not definitively seen.  This may represent a recently passed stone.  Correlate clinically for superimposed infection.   CT urogram may be obtained for further evaluation. 2.  Increased colonic stool burden.     Electronically signed by Clem Garcia MD on 04-12-23 at 0513      I ordered the above noted radiological studies.  These were independently interpreted and reviewed by me.  See dictation for official radiology interpretation.      PROCEDURES    Procedures      MEDICATIONS GIVEN IN ER    Medications   sodium chloride 0.9 % flush 10 mL (has no administration in time range)   sodium chloride 0.9 % bolus 1,000 mL (1,000 mL Intravenous New Bag 4/12/23 0337)   ondansetron (ZOFRAN) injection 4 mg (4 mg Intravenous Given 4/12/23 0339)   ketorolac (TORADOL) injection 15 mg (15 mg Intravenous Given 4/12/23 0339)         PROGRESS, DATA ANALYSIS, CONSULTS, AND MEDICAL DECISION MAKING    All labs have been independently reviewed by me.  All radiology studies have been reviewed by me and discussed with radiologist dictating the report.   EKG's independently viewed and interpreted by me.  Discussion below represents my analysis of pertinent findings related to patient's condition, differential diagnosis, treatment plan and final disposition.    DDx:  Includes, but is not limited to kidney stone, pyelonephritis, ureterolithiasis, UTI, muscle strain, back pain, rhabdomyolysis      ED Course as of 04/12/23 0534   Wed Apr 12, 2023   0407 Ketones, UA(!): Trace [JASMIN]   0407 Blood, UA(!): Large (3+) [JASMIN]   0407 Protein, UA(!): 30 mg/dL (1+) [JASMIN]   0407 Leukocytes, UA(!): Trace [JASMIN]   0407 RBC, UA(!): Too Numerous to Count [JASMIN]   0407 WBC, UA(!): 3-5  [JASMIN]   0407 Bacteria, UA: None Seen [JASMIN]   0407 WBC: 9.73 [JASMIN]   0407 Hemoglobin: 14.1 [JASMIN]   0407 Hematocrit: 40.9 [JASMIN]   0407 Platelets: 269 [JASMIN]   0407 Glucose(!): 112 [JASMIN]   0407 BUN: 15 [JASMIN]   0407 Creatinine: 1.26 [JASMIN]   0407 Sodium: 142 [JASMIN]   0407 Potassium: 3.6 [JASMIN]   0407 Chloride: 107 [JASMIN]   0407 CO2: 25.9 [JASMIN]   0407 Creatine Kinase(!): 712 [JASMIN]   0413 CT images independently viewed by me, my interpretation is multiple nonobstructing bilateral renal stones, there is mild right-sided hydronephrosis and a 3 to 4 mm distal right ureteral stone.  Appendix is normal, no evidence for cholecystitis. [JASMIN]   0414 Patient rechecked, resting comfortably in bed, no acute distress.  Discussed results, diagnosis, treatment plan.  Patient expresses understanding and agrees with plan. [JASMIN]   0523 Patient rechecked, pain has not returned.  Discussed official results of the CT scan and plan for discharge.  Patient expresses understanding agrees to plan. [JASMIN]      ED Course User Index  [JASMIN] Raul Ellis PA       MDM: Patient's CT scan shows mild right-sided hydronephrosis with multiple bilateral intrarenal stones, however his urine is not infected blood work is unremarkable and his pain is under control.  We will treat with analgesics, antiemetics, and refer to urology for recheck and further evaluation.  Vital signs are stable, patient is safe for discharge home.    PPE:  The patient wore a mask and I wore a mask and all appropriate PPE throughout the entire patient encounter.      AS OF 05:34 EDT VITALS:    BP - 112/74  HR - 53  TEMP - 98.6 °F (37 °C) (Tympanic)  O2 SATS - 97%      DIAGNOSIS  Final diagnoses:   Right flank pain   Hydronephrosis, unspecified hydronephrosis type   Bilateral nephrolithiasis   Allergic rhinitis, unspecified seasonality, unspecified trigger         DISPOSITION  DISCHARGE    Patient discharged in stable condition.    Reviewed implications of results, diagnosis, meds, responsibility to  follow up, warning signs and symptoms of possible worsening, potential complications and reasons to return to ER.    Patient/Family voiced understanding of above instructions.    Discussed plan for discharge, as there is no emergent indication for admission. Patient referred to primary care provider for BP management due to today's BP. Pt/family is agreeable and understands need for follow up and repeat testing.  Pt is aware that discharge does not mean that nothing is wrong but it indicates no emergency is present that requires admission and they must continue care with follow-up as given below or physician of their choice.     FOLLOW-UP  Luma Villavicencio, THEO  [address]  [facility] [address]  [facility] [address]  [phone]    Schedule an appointment as soon as possible for a visit       Tulio Reina MD  [address] [place], [place] [id number] [phone]    Schedule an appointment as soon as possible for a visit            Medication List      New Prescriptions    diclofenac 75 MG EC tablet  Commonly known as: VOLTAREN  Take 1 tablet by mouth 2 (Two) Times a Day.     fluticasone 50 MCG/ACT nasal spray  Commonly known as: FLONASE  2 sprays into the nostril(s) as directed by provider Daily.     ondansetron ODT 4 MG disintegrating tablet  Commonly known as: ZOFRAN-ODT  Place 1 tablet on the tongue Every 8 (Eight) Hours As Needed for Nausea or Vomiting.           Where to Get Your Medications      These medications were sent to [facility] [id number] - [facility] [address] [facility] - [phone]  - [phone]   [address] [address]    Phone: [phone]   · albuterol sulfate  (90 Base) MCG/ACT inhaler  · diclofenac 75 MG EC tablet  · fluticasone 50 MCG/ACT nasal spray  · ondansetron ODT 4 MG disintegrating tablet           Note Disclaimer: At [facility], we believe that sharing information builds trust and better relationships.  You are receiving this note because you recently visited [facility]. It is possible you will see health information before a provider has talked with you about it. This kind of information can be easy to misunderstand. To help you fully understand what it means for your health, we urge you to discuss this note with your provider.     Raul Ellis PA  04/12/23 0534

## 2023-05-30 ENCOUNTER — HOSPITAL ENCOUNTER (EMERGENCY)
Facility: HOSPITAL | Age: 33
Discharge: HOME | End: 2023-05-30
Attending: EMERGENCY MEDICINE | Admitting: EMERGENCY MEDICINE
Payer: COMMERCIAL

## 2023-05-30 VITALS
OXYGEN SATURATION: 100 % | DIASTOLIC BLOOD PRESSURE: 86 MMHG | HEART RATE: 53 BPM | WEIGHT: 168 LBS | RESPIRATION RATE: 18 BRPM | BODY MASS INDEX: 24.88 KG/M2 | HEIGHT: 69 IN | SYSTOLIC BLOOD PRESSURE: 136 MMHG | TEMPERATURE: 97.3 F

## 2023-05-30 DIAGNOSIS — T67.5XXA HEAT EXHAUSTION, INITIAL ENCOUNTER: Primary | ICD-10-CM

## 2023-05-30 LAB
ALBUMIN SERPL-MCNC: 4.6 G/DL (ref 3.5–5.2)
ALBUMIN/GLOB SERPL: 1.9 G/DL
ALP SERPL-CCNC: 55 U/L (ref 39–117)
ALT SERPL W P-5'-P-CCNC: 14 U/L (ref 1–41)
ANION GAP SERPL CALCULATED.3IONS-SCNC: 11.3 MMOL/L (ref 5–15)
AST SERPL-CCNC: 15 U/L (ref 1–40)
BASOPHILS # BLD AUTO: 0.04 10*3/MM3 (ref 0–0.2)
BASOPHILS NFR BLD AUTO: 0.5 % (ref 0–1.5)
BILIRUB SERPL-MCNC: 0.5 MG/DL (ref 0–1.2)
BILIRUB UR QL STRIP: NEGATIVE
BUN SERPL-MCNC: 14 MG/DL (ref 6–20)
BUN/CREAT SERPL: 11.8 (ref 7–25)
CALCIUM SPEC-SCNC: 9.9 MG/DL (ref 8.6–10.5)
CHLORIDE SERPL-SCNC: 104 MMOL/L (ref 98–107)
CK SERPL-CCNC: 135 U/L (ref 20–200)
CLARITY UR: CLEAR
CO2 SERPL-SCNC: 26.7 MMOL/L (ref 22–29)
COLOR UR: YELLOW
CREAT SERPL-MCNC: 1.19 MG/DL (ref 0.76–1.27)
DEPRECATED RDW RBC AUTO: 39.7 FL (ref 37–54)
EGFRCR SERPLBLD CKD-EPI 2021: 83.8 ML/MIN/1.73
EOSINOPHIL # BLD AUTO: 0.25 10*3/MM3 (ref 0–0.4)
EOSINOPHIL NFR BLD AUTO: 3 % (ref 0.3–6.2)
ERYTHROCYTE [DISTWIDTH] IN BLOOD BY AUTOMATED COUNT: 12.2 % (ref 12.3–15.4)
GLOBULIN UR ELPH-MCNC: 2.4 GM/DL
GLUCOSE SERPL-MCNC: 114 MG/DL (ref 65–99)
GLUCOSE UR STRIP-MCNC: NEGATIVE MG/DL
HCT VFR BLD AUTO: 39.6 % (ref 37.5–51)
HGB BLD-MCNC: 13.4 G/DL (ref 13–17.7)
HGB UR QL STRIP.AUTO: NEGATIVE
HOLD SPECIMEN: NORMAL
HOLD SPECIMEN: NORMAL
IMM GRANULOCYTES # BLD AUTO: 0.03 10*3/MM3 (ref 0–0.05)
IMM GRANULOCYTES NFR BLD AUTO: 0.4 % (ref 0–0.5)
KETONES UR QL STRIP: NEGATIVE
LEUKOCYTE ESTERASE UR QL STRIP.AUTO: NEGATIVE
LYMPHOCYTES # BLD AUTO: 2.04 10*3/MM3 (ref 0.7–3.1)
LYMPHOCYTES NFR BLD AUTO: 24.2 % (ref 19.6–45.3)
MCH RBC QN AUTO: 30 PG (ref 26.6–33)
MCHC RBC AUTO-ENTMCNC: 33.8 G/DL (ref 31.5–35.7)
MCV RBC AUTO: 88.6 FL (ref 79–97)
MONOCYTES # BLD AUTO: 0.56 10*3/MM3 (ref 0.1–0.9)
MONOCYTES NFR BLD AUTO: 6.7 % (ref 5–12)
MYOGLOBIN SERPL-MCNC: 36.6 NG/ML (ref 28–72)
NEUTROPHILS NFR BLD AUTO: 5.5 10*3/MM3 (ref 1.7–7)
NEUTROPHILS NFR BLD AUTO: 65.2 % (ref 42.7–76)
NITRITE UR QL STRIP: NEGATIVE
NRBC BLD AUTO-RTO: 0 /100 WBC (ref 0–0.2)
PH UR STRIP.AUTO: 6.5 [PH] (ref 5–8)
PLATELET # BLD AUTO: 288 10*3/MM3 (ref 140–450)
PMV BLD AUTO: 9.6 FL (ref 6–12)
POTASSIUM SERPL-SCNC: 3.9 MMOL/L (ref 3.5–5.2)
PROT SERPL-MCNC: 7 G/DL (ref 6–8.5)
PROT UR QL STRIP: NEGATIVE
RBC # BLD AUTO: 4.47 10*6/MM3 (ref 4.14–5.8)
SODIUM SERPL-SCNC: 142 MMOL/L (ref 136–145)
SP GR UR STRIP: 1.03 (ref 1–1.03)
UROBILINOGEN UR QL STRIP: NORMAL
WBC NRBC COR # BLD: 8.42 10*3/MM3 (ref 3.4–10.8)
WHOLE BLOOD HOLD COAG: NORMAL
WHOLE BLOOD HOLD SPECIMEN: NORMAL

## 2023-05-30 PROCEDURE — 96375 TX/PRO/DX INJ NEW DRUG ADDON: CPT

## 2023-05-30 PROCEDURE — 99283 EMERGENCY DEPT VISIT LOW MDM: CPT

## 2023-05-30 PROCEDURE — 25010000002 HYDROMORPHONE PER 4 MG: Performed by: EMERGENCY MEDICINE

## 2023-05-30 PROCEDURE — 80053 COMPREHEN METABOLIC PANEL: CPT

## 2023-05-30 PROCEDURE — 81003 URINALYSIS AUTO W/O SCOPE: CPT | Performed by: EMERGENCY MEDICINE

## 2023-05-30 PROCEDURE — 96374 THER/PROPH/DIAG INJ IV PUSH: CPT

## 2023-05-30 PROCEDURE — 85025 COMPLETE CBC W/AUTO DIFF WBC: CPT

## 2023-05-30 PROCEDURE — 25010000002 ONDANSETRON PER 1 MG: Performed by: EMERGENCY MEDICINE

## 2023-05-30 PROCEDURE — 83874 ASSAY OF MYOGLOBIN: CPT | Performed by: EMERGENCY MEDICINE

## 2023-05-30 PROCEDURE — 82550 ASSAY OF CK (CPK): CPT | Performed by: EMERGENCY MEDICINE

## 2023-05-30 RX ORDER — ONDANSETRON 2 MG/ML
4 INJECTION INTRAMUSCULAR; INTRAVENOUS ONCE
Status: COMPLETED | OUTPATIENT
Start: 2023-05-30 | End: 2023-05-30

## 2023-05-30 RX ORDER — SODIUM CHLORIDE 0.9 % (FLUSH) 0.9 %
10 SYRINGE (ML) INJECTION AS NEEDED
Status: DISCONTINUED | OUTPATIENT
Start: 2023-05-30 | End: 2023-05-31 | Stop reason: HOSPADM

## 2023-05-30 RX ORDER — HYDROMORPHONE HYDROCHLORIDE 1 MG/ML
0.5 INJECTION, SOLUTION INTRAMUSCULAR; INTRAVENOUS; SUBCUTANEOUS ONCE
Status: COMPLETED | OUTPATIENT
Start: 2023-05-30 | End: 2023-05-30

## 2023-05-30 RX ADMIN — ONDANSETRON 4 MG: 2 INJECTION INTRAMUSCULAR; INTRAVENOUS at 19:53

## 2023-05-30 RX ADMIN — SODIUM CHLORIDE 1000 ML: 9 INJECTION, SOLUTION INTRAVENOUS at 19:58

## 2023-05-30 RX ADMIN — HYDROMORPHONE HYDROCHLORIDE 0.5 MG: 1 INJECTION, SOLUTION INTRAMUSCULAR; INTRAVENOUS; SUBCUTANEOUS at 19:54

## 2023-05-30 RX ADMIN — SODIUM CHLORIDE 1000 ML: 9 INJECTION, SOLUTION INTRAVENOUS at 19:49

## 2023-05-30 NOTE — ED PROVIDER NOTES
" EMERGENCY DEPARTMENT ENCOUNTER    Room Number:  22/22  Date seen:  5/30/2023  PCP: Luma Villavicencio APRN  Historian: Patient      HPI:  Chief Complaint: \"I think I got too hot at work\"    A complete HPI/ROS/PMH/PSH/SH/FH are unobtainable due to: N/A    Context: Sachin Singer is a 32 y.o. male who presents to the ED c/o getting overheated at work.  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  They do have cooling stations.  Today, he had been awake since about 7 AM around 3:00 PM he felt dizzy and lightheaded.  He developed a headache and was near syncopal.  He is nauseated but has not had any vomiting.  No recent medication changes.  No fevers or chills.  No chest pain no shortness of breath.        Additional sources:  - Discussed/ obtained information from independent historians:-He is unaccompanied.    - External (non-ED) record review: He has a history of migraines and asthma.  No recent ER visits or hospitalizations at [facility]    - Chronic or social conditions impacting care: None of which I am aware.      PAST MEDICAL HISTORY  Active Ambulatory Problems     Diagnosis Date Noted   • No Active Ambulatory Problems     Resolved Ambulatory Problems     Diagnosis Date Noted   • No Resolved Ambulatory Problems     Past Medical History:   Diagnosis Date   • Asthma    • Kidney stone    • Seasonal allergies          PAST SURGICAL HISTORY  Past Surgical History:   Procedure Laterality Date   • KIDNEY STONE SURGERY           FAMILY HISTORY  History reviewed. No pertinent family history.      SOCIAL HISTORY  Social History     Socioeconomic History   • Marital status:    Tobacco Use   • Smoking status: Never   • Smokeless tobacco: Current     Types: Chew   Substance and Sexual Activity   • Alcohol use: Yes     Comment: rarely   • Drug use: Never         ALLERGIES  Patient has no known allergies.        REVIEW OF SYSTEMS  Review of Systems   Constitutional: Positive for diaphoresis and fatigue. " Negative for fever.   Neurological: Positive for dizziness, syncope (Near), weakness, light-headedness and headaches.            PHYSICAL EXAM  ED Triage Vitals [05/30/23 1748]   Temp Heart Rate Resp BP SpO2   97.3 °F (36.3 °C) 72 18 139/98 97 %      Temp src Heart Rate Source Patient Position BP Location FiO2 (%)   Tympanic Monitor Standing Left arm --       Physical Exam      Physical Exam   Constitutional: Pt. is oriented to person, place, and time.  He is well-developed, well-nourished, and in no distress.    HENT: Normocephalic and atraumatic. Pupils are equal, round, and reactive to light.   Neck: Normal range of motion. Neck supple. No JVD present. No tracheal deviation present.   Cardiovascular: Normal rate, regular rhythm and normal heart sounds.   Pulmonary/Chest: Effort normal and breath sounds normal. No stridor. No respiratory distress. No wheezes, no rales.   Abdominal: Soft.  No distension. There is no tenderness. There is no rebound and no guarding.   Musculoskeletal: No edema, tenderness or deformity.   Neurological: He is alert and oriented to person, place, and time.  He has no focal neurologic deficits.  Skin: Skin is warm and dry. Pt. is not diaphoretic.  Psychiatric: Mood, affect normal.  He is pleasant and cooperative.  Nursing note and vitals reviewed.            LAB RESULTS  Recent Results (from the past 24 hour(s))   Comprehensive Metabolic Panel    Collection Time: 05/30/23  6:13 PM    Specimen: Blood   Result Value Ref Range    Glucose 114 (H) 65 - 99 mg/dL    BUN 14 6 - 20 mg/dL    Creatinine 1.19 0.76 - 1.27 mg/dL    Sodium 142 136 - 145 mmol/L    Potassium 3.9 3.5 - 5.2 mmol/L    Chloride 104 98 - 107 mmol/L    CO2 26.7 22.0 - 29.0 mmol/L    Calcium 9.9 8.6 - 10.5 mg/dL    Total Protein 7.0 6.0 - 8.5 g/dL    Albumin 4.6 3.5 - 5.2 g/dL    ALT (SGPT) 14 1 - 41 U/L    AST (SGOT) 15 1 - 40 U/L    Alkaline Phosphatase 55 39 - 117 U/L    Total Bilirubin 0.5 0.0 - 1.2 mg/dL    Globulin 2.4  gm/dL    A/G Ratio 1.9 g/dL    BUN/Creatinine Ratio 11.8 7.0 - 25.0    Anion Gap 11.3 5.0 - 15.0 mmol/L    eGFR 83.8 >60.0 mL/min/1.73   Green Top (Gel)    Collection Time: 05/30/23  6:13 PM   Result Value Ref Range    Extra Tube Hold for add-ons.    Lavender Top    Collection Time: 05/30/23  6:13 PM   Result Value Ref Range    Extra Tube hold for add-on    Gold Top - SST    Collection Time: 05/30/23  6:13 PM   Result Value Ref Range    Extra Tube Hold for add-ons.    Light Blue Top    Collection Time: 05/30/23  6:13 PM   Result Value Ref Range    Extra Tube Hold for add-ons.    CBC Auto Differential    Collection Time: 05/30/23  6:13 PM    Specimen: Blood   Result Value Ref Range    WBC 8.42 3.40 - 10.80 10*3/mm3    RBC 4.47 4.14 - 5.80 10*6/mm3    Hemoglobin 13.4 13.0 - 17.7 g/dL    Hematocrit 39.6 37.5 - 51.0 %    MCV 88.6 79.0 - 97.0 fL    MCH 30.0 26.6 - 33.0 pg    MCHC 33.8 31.5 - 35.7 g/dL    RDW 12.2 (L) 12.3 - 15.4 %    RDW-SD 39.7 37.0 - 54.0 fl    MPV 9.6 6.0 - 12.0 fL    Platelets 288 140 - 450 10*3/mm3    Neutrophil % 65.2 42.7 - 76.0 %    Lymphocyte % 24.2 19.6 - 45.3 %    Monocyte % 6.7 5.0 - 12.0 %    Eosinophil % 3.0 0.3 - 6.2 %    Basophil % 0.5 0.0 - 1.5 %    Immature Grans % 0.4 0.0 - 0.5 %    Neutrophils, Absolute 5.50 1.70 - 7.00 10*3/mm3    Lymphocytes, Absolute 2.04 0.70 - 3.10 10*3/mm3    Monocytes, Absolute 0.56 0.10 - 0.90 10*3/mm3    Eosinophils, Absolute 0.25 0.00 - 0.40 10*3/mm3    Basophils, Absolute 0.04 0.00 - 0.20 10*3/mm3    Immature Grans, Absolute 0.03 0.00 - 0.05 10*3/mm3    nRBC 0.0 0.0 - 0.2 /100 WBC   CK    Collection Time: 05/30/23  6:13 PM    Specimen: Blood   Result Value Ref Range    Creatine Kinase 135 20 - 200 U/L   Myoglobin, Serum    Collection Time: 05/30/23  6:13 PM    Specimen: Blood   Result Value Ref Range    Myoglobin 36.6 28.0 - 72.0 ng/mL   Urinalysis With Microscopic If Indicated (No Culture) - Urine, Clean Catch    Collection Time: 05/30/23  7:50 PM     Specimen: Urine, Clean Catch   Result Value Ref Range    Color, UA Yellow Yellow, Straw    Appearance, UA Clear Clear    pH, UA 6.5 5.0 - 8.0    Specific Gravity, UA 1.027 1.005 - 1.030    Glucose, UA Negative Negative    Ketones, UA Negative Negative    Bilirubin, UA Negative Negative    Blood, UA Negative Negative    Protein, UA Negative Negative    Leuk Esterase, UA Negative Negative    Nitrite, UA Negative Negative    Urobilinogen, UA 1.0 E.U./dL 0.2 - 1.0 E.U./dL       Ordered the above labs and reviewed the results.        RADIOLOGY  No Radiology Exams Resulted Within Past 24 Hours    Ordered the above noted radiological studies. Reviewed by me in PACS.        PROCEDURES  Procedures      MEDICATIONS GIVEN IN ER  Medications   sodium chloride 0.9 % flush 10 mL (has no administration in time range)   sodium chloride 0.9 % flush 10 mL (has no administration in time range)   sodium chloride 0.9 % bolus 1,000 mL (0 mL Intravenous Stopped 5/30/23 2045)   sodium chloride 0.9 % bolus 1,000 mL (0 mL Intravenous Stopped 5/30/23 2045)   HYDROmorphone (DILAUDID) injection 0.5 mg (0.5 mg Intravenous Given 5/30/23 1954)   ondansetron (ZOFRAN) injection 4 mg (4 mg Intravenous Given 5/30/23 1953)             MEDICAL DECISION MAKING, PROGRESS, and CONSULTS    All labs have been independently reviewed by me.  All radiology studies have been reviewed by me and discussed with radiologist dictating the report.   EKG's independently viewed and interpreted by me.  Discussion below represents my analysis of pertinent findings related to patient's condition, differential diagnosis, treatment plan and final disposition.      Orders placed during this visit:  Orders Placed This Encounter   Procedures   • [place] Draw   • Comprehensive Metabolic Panel   • CBC Auto Differential   • Urinalysis With Microscopic If Indicated (No Culture) - Urine, Clean Catch   • CK   • Myoglobin, Serum   • Insert peripheral IV   • Insert Peripheral IV   •  CBC & Differential   • Green Top (Gel)   • Lavender Top   • Gold Top - SST   • Light Blue Top       Additional orders considered but not ordered:  N/A    Differential diagnosis:  Differential diagnosis for generalized weakness includes but is not limited to:  Neuromuscular weakness , CVA, Hemorrhagic stroke, Multiple sclerosis, Amyotrophic Lateral Sclerosis (ALS), Spinal cord disease: Infection (Epidural abscess), Infarction/ischemia, Trauma (Spinal Cord Syndromes), Inflammation (Transverse Myelitis), Tumor, Peripheral nerve disease: Guillain-[place] syndrome, Diabetic peripheral neuropathy, NMJ disease: Myasthenia gravis crisis, Rhabdomyolysis, Alcoholic myopathy, Non-neuromuscular weakness , ACS, Arrhythmia/Syncope, Severe infection/Sepsis, Hypoglycemia, electrolyte disturbance (K, Mg, Ca), Hypokalemic periodic paralysis, hypothyroidism, Respiratory failure, Symptomatic Anemia, Severe dehydration, Polypharmacy, Malignancy      Independent interpretation of labs, radiology studies, and discussions with consultants:  ED Course as of 05/30/23 2143 Tue May 30, 2023   1931 CBC and CMP unremarkable. [WC]   2005 Myoglobin: 36.6 [WC]   2005 Creatine Kinase: 135 [WC]      ED Course User Index  [WC] Andre Morataya MD              Appropriate PPE was worn by myself and the patient throughout our entire interaction.    DIAGNOSIS  Final diagnoses:   Heat exhaustion, initial encounter         DISPOSITION  Discharge            Latest Documented Vital Signs:  As of 21:43 EDT  BP- 136/86 HR- 53 Temp- 97.3 °F (36.3 °C) (Tympanic) O2 sat- 100%        --    Please note that portions of this were completed with a voice recognition program.       Note Disclaimer: At [facility], we believe that sharing information builds trust and better relationships. You are receiving this note because you are receiving care at [facility] or recently visited. It is possible you will see health information before a provider has talked with  you about it. This kind of information can be easy to misunderstand. To help you fully understand what it means for your health, we urge you to discuss this note with your provider.           Andre Morataya MD  05/30/23 2146

## 2023-05-30 NOTE — Clinical Note
[facility] EMERGENCY DEPARTMENT  [address] [facility] [address]  Phone: [phone]    Sachin Singer was seen and treated in our emergency department on 5/30/2023.  He may return to work on 06/01/2023.         Thank you for choosing [facility].    Andre Morataya MD

## 2023-05-30 NOTE — ED NOTES
Pt arrives ambulatory to triage for dizziness and weakness. Pt reports both of his arms went numb and tingling. Pt states he has a headache that has been pushing on the back of his neck. Pt has a history of migraines. 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